# Patient Record
Sex: MALE | Race: WHITE
[De-identification: names, ages, dates, MRNs, and addresses within clinical notes are randomized per-mention and may not be internally consistent; named-entity substitution may affect disease eponyms.]

---

## 2024-02-13 NOTE — CR1_ITS
[facility] 
                                        
                                       Test Date:    2024 
Pat Name:     Jairo Pyle       Department:    
Patient ID:   [id number]               Room:         - 
Gender:       Male                     Technician:    
:          1990               Requested By: BOOGIE PARMAR 
Order Number: [id number]              Alexis MD:   BOOGIE PARMAR 
                           Interpretive Statements 
Session Date: 
 
Electronically Signed On 2024 20:55:11 EST by BOOGIE PARMAR

## 2024-02-14 ENCOUNTER — HOSPITAL ENCOUNTER (OUTPATIENT)
Dept: HOSPITAL 101 - CR | Age: 34
LOS: 21 days | Discharge: HOME | End: 2024-03-06
Payer: COMMERCIAL

## 2024-02-14 DIAGNOSIS — I42.9: ICD-10-CM

## 2024-02-14 DIAGNOSIS — I51.3: ICD-10-CM

## 2024-02-14 DIAGNOSIS — I22.2: Primary | ICD-10-CM

## 2024-02-14 PROCEDURE — 93798 PHYS/QHP OP CAR RHAB W/ECG: CPT

## 2024-02-15 ENCOUNTER — HOSPITAL ENCOUNTER (EMERGENCY)
Age: 34
LOS: 1 days | Discharge: ANOTHER HOSPITAL | End: 2024-02-16
Payer: COMMERCIAL

## 2024-02-15 VITALS — RESPIRATION RATE: 26 BRPM | HEART RATE: 66 BPM

## 2024-02-15 VITALS — RESPIRATION RATE: 17 BRPM | HEART RATE: 73 BPM

## 2024-02-15 VITALS — HEART RATE: 65 BPM | RESPIRATION RATE: 20 BRPM

## 2024-02-15 VITALS — HEART RATE: 69 BPM | RESPIRATION RATE: 17 BRPM | OXYGEN SATURATION: 100 %

## 2024-02-15 VITALS — HEART RATE: 77 BPM | RESPIRATION RATE: 20 BRPM

## 2024-02-15 VITALS
RESPIRATION RATE: 19 BRPM | HEART RATE: 67 BPM | SYSTOLIC BLOOD PRESSURE: 132 MMHG | OXYGEN SATURATION: 100 % | DIASTOLIC BLOOD PRESSURE: 78 MMHG

## 2024-02-15 VITALS — RESPIRATION RATE: 19 BRPM | HEART RATE: 62 BPM

## 2024-02-15 VITALS — HEART RATE: 67 BPM | RESPIRATION RATE: 17 BRPM

## 2024-02-15 VITALS
HEART RATE: 72 BPM | DIASTOLIC BLOOD PRESSURE: 89 MMHG | SYSTOLIC BLOOD PRESSURE: 139 MMHG | OXYGEN SATURATION: 99 % | TEMPERATURE: 97.88 F | RESPIRATION RATE: 18 BRPM

## 2024-02-15 VITALS — SYSTOLIC BLOOD PRESSURE: 139 MMHG | DIASTOLIC BLOOD PRESSURE: 88 MMHG | OXYGEN SATURATION: 100 %

## 2024-02-15 VITALS — HEART RATE: 63 BPM

## 2024-02-15 VITALS — OXYGEN SATURATION: 99 % | HEART RATE: 71 BPM | RESPIRATION RATE: 23 BRPM

## 2024-02-15 VITALS — HEART RATE: 68 BPM | RESPIRATION RATE: 18 BRPM

## 2024-02-15 VITALS — HEART RATE: 66 BPM | RESPIRATION RATE: 17 BRPM

## 2024-02-15 VITALS — OXYGEN SATURATION: 98 % | RESPIRATION RATE: 19 BRPM | HEART RATE: 74 BPM

## 2024-02-15 VITALS — RESPIRATION RATE: 22 BRPM

## 2024-02-15 VITALS — HEART RATE: 70 BPM | RESPIRATION RATE: 19 BRPM

## 2024-02-15 VITALS — OXYGEN SATURATION: 100 % | HEART RATE: 73 BPM | RESPIRATION RATE: 16 BRPM

## 2024-02-15 VITALS — RESPIRATION RATE: 17 BRPM | HEART RATE: 64 BPM

## 2024-02-15 VITALS — RESPIRATION RATE: 17 BRPM | HEART RATE: 71 BPM

## 2024-02-15 VITALS — RESPIRATION RATE: 15 BRPM | HEART RATE: 65 BPM

## 2024-02-15 VITALS — OXYGEN SATURATION: 99 % | HEART RATE: 75 BPM | RESPIRATION RATE: 24 BRPM

## 2024-02-15 VITALS — HEART RATE: 71 BPM | RESPIRATION RATE: 20 BRPM

## 2024-02-15 VITALS — RESPIRATION RATE: 19 BRPM | HEART RATE: 63 BPM

## 2024-02-15 VITALS — HEART RATE: 72 BPM | RESPIRATION RATE: 15 BRPM

## 2024-02-15 VITALS — HEART RATE: 75 BPM | RESPIRATION RATE: 12 BRPM

## 2024-02-15 VITALS — SYSTOLIC BLOOD PRESSURE: 141 MMHG | DIASTOLIC BLOOD PRESSURE: 86 MMHG | HEART RATE: 67 BPM | RESPIRATION RATE: 16 BRPM

## 2024-02-15 VITALS — BODY MASS INDEX: 29.8 KG/M2

## 2024-02-15 VITALS — HEART RATE: 67 BPM

## 2024-02-15 VITALS — RESPIRATION RATE: 16 BRPM | HEART RATE: 69 BPM | OXYGEN SATURATION: 100 %

## 2024-02-15 VITALS — RESPIRATION RATE: 18 BRPM | HEART RATE: 78 BPM

## 2024-02-15 DIAGNOSIS — I25.2: ICD-10-CM

## 2024-02-15 DIAGNOSIS — Z87.891: ICD-10-CM

## 2024-02-15 DIAGNOSIS — Z79.82: ICD-10-CM

## 2024-02-15 DIAGNOSIS — Z79.01: ICD-10-CM

## 2024-02-15 DIAGNOSIS — G45.9: Primary | ICD-10-CM

## 2024-02-15 DIAGNOSIS — Z79.899: ICD-10-CM

## 2024-02-15 LAB
ADD MANUAL DIFF: NO
ALANINE AMINOTRANSFERASE: 135 U/L (ref 16–63)
ALBUMIN GLOBULIN RATIO: 1
ALBUMIN LEVEL: 4 G/DL (ref 3.4–5)
ALKALINE PHOSPHATASE: 65 U/L (ref 46–116)
ANION GAP: 11.5
ASPARTATE AMINO TRANSFERASE: 50 U/L (ref 15–37)
BLOOD UREA NITROGEN: 12 MG/DL (ref 7–18)
CALCIUM: 9.3 MG/DL (ref 8.5–10.1)
CARBON DIOXIDE: 28.5 MMOL/L (ref 21–32)
CHLORIDE: 104 MMOL/L (ref 98–107)
CO2 BLD-SCNC: 28.5 MMOL/L (ref 21–32)
ESTIMATED GFR (AFRICAN AMERICA: >60 (ref 60–?)
ESTIMATED GFR (NON-AFRICAN AME: >60 (ref 60–?)
GLOBULIN: 3.9 G/DL
GLUCOSE BLD-MCNC: 116 MG/DL (ref 74–106)
HCT VFR BLD CALC: 46 % (ref 42–54)
HEMATOCRIT: 46 % (ref 42–54)
HEMOGLOBIN: 15.6 G/DL (ref 14–18)
IMMATURE GRANULOCYTES ABS AUTO: 0.02 10^3/UL (ref 0–0.03)
IMMATURE GRANULOCYTES PCT AUTO: 0.4 % (ref 0–0.5)
INR: 1.01
LYMPHOCYTES  ABSOLUTE AUTO: 2 10^3/UL (ref 1.2–3.8)
MCV RBC: 89 FL (ref 80–94)
MEAN CORPUSCULAR HEMOGLOBIN: 30.2 PG (ref 25.9–34)
MEAN CORPUSCULAR HGB CONC: 33.9 G/DL (ref 29.9–35.2)
MEAN CORPUSCULAR VOLUME: 89 FL (ref 80–94)
PARTIAL THROMBOPLASTIN TIME: 31.1 SEC (ref 22.3–36.2)
PLATELET # BLD: 146 10^3/UL (ref 150–450)
PLATELET COUNT: 146 10^3/UL (ref 150–450)
POTASSIUM SERPLBLD-SCNC: 4 MMOL/L (ref 3.5–5.1)
POTASSIUM: 4 MMOL/L (ref 3.5–5.1)
PROTHROMBIN TIME: 10.7 SEC (ref 9–11.6)
RED BLOOD COUNT: 5.17 10^6/UL (ref 4.7–6.1)
SODIUM BLD-SCNC: 140 MMOL/L (ref 136–145)
SODIUM: 140 MMOL/L (ref 136–145)
TOTAL PROTEIN: 7.9 G/DL (ref 6.4–8.2)
WBC # BLD: 5.7 10^3/UL (ref 4–11)
WHITE BLOOD COUNT: 5.7 10^3/UL (ref 4–11)

## 2024-02-15 PROCEDURE — 80053 COMPREHEN METABOLIC PANEL: CPT

## 2024-02-15 PROCEDURE — 70450 CT HEAD/BRAIN W/O DYE: CPT

## 2024-02-15 PROCEDURE — 36415 COLL VENOUS BLD VENIPUNCTURE: CPT

## 2024-02-15 PROCEDURE — 96374 THER/PROPH/DIAG INJ IV PUSH: CPT

## 2024-02-15 PROCEDURE — 70496 CT ANGIOGRAPHY HEAD: CPT

## 2024-02-15 PROCEDURE — 99285 EMERGENCY DEPT VISIT HI MDM: CPT

## 2024-02-15 PROCEDURE — 85730 THROMBOPLASTIN TIME PARTIAL: CPT

## 2024-02-15 PROCEDURE — 84484 ASSAY OF TROPONIN QUANT: CPT

## 2024-02-15 PROCEDURE — 96375 TX/PRO/DX INJ NEW DRUG ADDON: CPT

## 2024-02-15 PROCEDURE — 85025 COMPLETE CBC W/AUTO DIFF WBC: CPT

## 2024-02-15 PROCEDURE — 93005 ELECTROCARDIOGRAM TRACING: CPT

## 2024-02-15 PROCEDURE — 70498 CT ANGIOGRAPHY NECK: CPT

## 2024-02-15 PROCEDURE — 85610 PROTHROMBIN TIME: CPT

## 2024-02-15 NOTE — CT_ITS
[address] [address] 
     [phone] 
  
  
Patient Name: 
BRITTANY CHAMORRO 
  
MRN: [id number]    YOB: 1990    Sex: M 
Assigned Patient Location: ER 
Current Patient Location:  
Accession/Order Number: [id number] 
Exam Date: 2/15/2024  21:00    Report Date: 2/15/2024  21:56 
  
At the request of: 
MIRIAM WILHELM   
  
Procedure:  CT angio head 
  
EXAMINATION: CT Angiogram Neck and CT Angiogram Head 
  
TECHNIQUE: Multiple axial images of the head and neck were obtained during the  
  
dynamic intravenous administration of contrast material according to the  
standard protocol. 3-D volume rendered reformations were created on an  
independent workstation. The amount and type of contrast are recorded in the  
medical record.  
  
QPP DOCUMENTATION: All internal carotid artery percent stenoses are calculated  
  
using the distal internal carotid artery diameter as the denominator (NASCET  
criteria). At least one of the following dose reduction techniques was  
utilized: Iterative reconstruction, and/or Automatic Exposure Control, and/or  
mA/kV adjustment based on body size. 
  
INDICATION: Visual disturbance, slurred speech 
  
COMPARISON: CT head, 2/15/2024 
  
ENCOUNTER: Not applicable 
  
___________________________ 
  
FINDINGS: 
  
CTA NECK: 
Aortic Arch/Great Vessels: No aneurysmal dilation or dissection of the aortic  
arch. The brachiocephalic and subclavian arteries are patent.  
  
Right Carotid: No significant stenoses, occlusions or dissections of the  
common  
carotid, cervical internal carotid, or proximal external carotid arteries.  
  
Left Carotid: No significant stenoses, occlusions or dissections of the common  
  
carotid, cervical internal carotid, or proximal external carotid arteries 
  
Right Vertebral: Codominant. No significant stenoses, occlusions or  
dissections  
of the cervical segments of the artery.  
  
Left Vertebral: Codominant. No significant stenoses, occlusions or dissections  
  
of the cervical segments of the artery.  
  
Additional Findings: Mild straightening of the normal cervical lordosis. 
  
CTA HEAD: 
ICAs: The petrous, cavernous, and supraclinoid segments are patent. 
  
ACAs: No significant stenoses, occlusions, or aneurysms. 
  
MCAs: No significant stenoses, occlusions, or aneurysms. 
  
PCAs: No significant stenoses, occlusions, or aneurysms. 
  
Basilar: No significant stenoses, occlusions, or aneurysms. 
  
Vertebral Arteries: No significant stenoses, occlusions, or aneurysms. 
  
Dural Venous Sinuses: Limited assessment of the dural venous sinuses  
demonstrates no evidence of thrombosis. 
  
Additional Findings: None 
  
___________________________ 
  
ORDER #: [id number] CT/CT angio head  
IMPRESSION:  
   
1. No significant stenoses or occlusions throughout the extracranial carotid   
and vertebral arteries.  
2. No large vessel occlusions throughout the intracranial arteries  
   
   
Electronically authenticated by: DEANNE WEEKS   Date: 2/15/2024  21:56

## 2024-02-15 NOTE — ED_ITS
Documented by User:  Miriam Wilhelm  02/15/24 21:51    
    
HPI - Neuro Symptoms/Deficit    
General    
Chief Complaint: Neuro Symptoms/Deficit    
Stated Complaint: neuro symptoms    
Time Seen by Provider: 02/15/24 19:18    
Source: patient    
Mode of arrival: Wheelchair    
History of Present Illness    
HPI Narrative:     
33 year old male presents to the ED for blurred vision and slurred speech. Onset  
was approx 1845 with the blurred vision. He reported it was to his right eye;   
peripheral vision on the right. Significant other states he then developed   
slurred speech. Wife states it lasted 2-3 minutes. He currently reports   
intermittent N/T to various digits of his right hand. Mid  he was   
admitted to [facility] for an MI. He was told his MI was likely related  
to a viral illness he had in . He was told his EF was 20%. Pt is   
taking Eliquis and 81 mg asa daily. He denies fever, chills, weakness. Denies   
recent CP, SOB, dizziness. Denies recent fall. Denies pain currently. States he   
now feels anxious. 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 States his Entresto dose   
was doubled yesterday.    
Related Data    
                                Home Medications    
    
    
    
 Medication  Instructions  Recorded  Confirmed    
     
apixaban 5 mg tablet (Eliquis) 5 mg PO Q12H 02/15/24 02/15/24    
     
aspirin 81 mg tablet,delayed 81 mg PO .q24 02/15/24 02/15/24    
    
release       
     
empagliflozin 10 mg tablet 10 mg PO .q24 02/15/24 02/15/24    
    
(Jardiance)       
     
furosemide 40 mg tablet 40 mg PO Q12H 02/15/24 02/15/24    
     
metoprolol succinate 50 mg 50 mg PO .q24 02/15/24 02/15/24    
    
tablet,extended release 24 hr       
     
sacubitril 49 mg-valsartan 51 mg 1 tab PO BID 02/15/24 02/15/24    
    
tablet (Entresto)       
     
spironolactone 25 mg tablet 25 mg PO .q24 02/15/24 02/15/24    
    
    
    
                                    Allergies    
    
    
    
Allergy/AdvReac Type Severity Reaction Status Date / Time    
     
iodine Allergy Severe Anaphylaxis Verified 02/15/24 19:18    
    
    
    
    
Review of Systems    
    
    
ROS      
    
 Constitutional Denies: fever or chills       
    
 Eyes Reports: change in vision and blurry vision; Denies: blind spots, light   
sensitivity or eye discomfort       
    
 Ears, nose, mouth, and throat Denies: throat pain or neck pain       
    
 Cardiovascular Denies: chest pain       
    
 Respiratory Denies: shortness of breath       
    
 Gastrointestinal Denies: abdominal pain       
    
 Musculoskeletal Denies: neck pain, extremity pain or extremity swelling       
    
 Integumentary/Breast Denies: rash       
    
 Neurological Reports: numbness in extremities, slurred speech and difficulty   
communicating thoughts; Denies: headache, weakness in extremities, lack of   
coordination or dizziness       
    
 Psychiatric Reports: anxiety       
    
    
[facility]    
[facility]    
Medical History (Updated 02/15/24 @ 23:37 by Tamika Tejada MD)    
    
Heart attack    
   ?I21.9 - Acute myocardial infarction, unspecified (ICD-10)    
    
    
Social History    
Smoking status:  Former smoker     
    
    
    
Exam    
Constitutional    
Vital Signs, click to edit/add:     
    
                                Last Vital Signs    
    
    
    
Temp  98 F   02/15/24 19:11    
     
Pulse  63   24 00:14    
     
Resp  21   24 00:14    
     
BP  113/69   24 00:14    
     
Pulse Ox  99   02/15/24 20:40    
     
O2 Del Method  Room Air  02/15/24 19:11    
    
    
    
    
Common normals: no apparent distress and oriented x3    
General appearance: cooperative    
HENMT    
Common normals: normocephalic    
Face and sinus: normal facial exam    
Nose: external nose normal    
Mouth: oral and palatal mucosa normal, lip normal and tongue normal;     
no drooling    
Throat: uvula midline    
Eye    
Common normals: PERRL, EOMs intact bilaterally, conjunctivae normal and no   
scleral icterus    
Visual fields: no peripheral vision loss and no central vision loss    
Neck & C-Spine    
Common normals: supple    
Chest    
Chest: symmetrical chest wall rise    
Respiratory    
Common normals: normal respiratory effort and clear to auscultation bilaterally    
Effort & inspection: able to speak in complete sentences and symmetric chest   
movement    
Cardio    
Common normals: regular rate and regular rhythm    
Extremity    
Common normals: normal to inspection, full ROM and normal capillary refill    
Neuro    
Common normals: oriented x3, CN's II-XII intact bilaterally, moves all   
extremities, no focal motor deficits, no sensory deficits noted and gait normal    
Sensorium/orientation: awake and alert    
Speech: speech normal    
Motor exam: strength 5/5 throughout and no pronator drift    
Coordination: finger-to-nose test normal and heel-to-shin test normal    
Other:     
NIH Stroke Scale/Score (NIHSS) from [email or web address]  on 2/15/2024    
** All calculations should be rechecked by clinician prior to use **    
    
RESULT SUMMARY:    
0 points    
NIH Stroke Scale    
    
    
INPUTS:    
1A: Level of consciousness ?> 0 = Alert; keenly responsive    
1B: Ask month and age ?> 0 = Both questions right    
1C: 'Blink eyes' & 'squeeze hands' ?> 0 = Performs both tasks    
2: Horizontal extraocular movements ?> 0 = Normal    
3: Visual fields ?> 0 = No visual loss    
4: Facial palsy ?> 0 = Normal symmetry    
5A: Left arm motor drift ?> 0 = No drift for 10 seconds    
5B: Right arm motor drift ?> 0 = No drift for 10 seconds    
6A: Left leg motor drift ?> 0 = No drift for 5 seconds    
6B: Right leg motor drift ?> 0 = No drift for 5 seconds    
7: Limb Ataxia ?> 0 = No ataxia    
8: Sensation ?> 0 = Normal; no sensory loss    
9: Language/aphasia ?> 0 = Normal; no aphasia    
10: Dysarthria ?> 0 = Normal    
11: Extinction/inattention ?> 0 = No abnormality    
    
    
Course    
Reevaluation(s)    
Reevaluation #1:     
Pt reports all sx have resolved, including the N/T to his right hand.    
Time: 21:03    
Vital Signs    
Vital signs:     
    
                                   Vital Signs    
    
    
    
Temperature  98 F   02/15/24 19:11    
     
Pulse Rate  72   02/15/24 19:11    
     
Respiratory Rate  18   02/15/24 19:11    
     
Blood Pressure  139/89   02/15/24 19:11    
     
Pulse Oximetry  99   02/15/24 19:11    
     
Oxygen Delivery Method  Room Air  02/15/24 19:11    
    
    
                                            
    
    
    
Temperature  98 F   02/15/24 19:11    
     
Pulse Rate  63   24 00:14    
     
Respiratory Rate  21   24 00:14    
     
Blood Pressure  113/69   24 00:14    
     
Pulse Oximetry  99   02/15/24 20:40    
     
Oxygen Delivery Method  Room Air  02/15/24 19:11    
    
    
    
    
    
MDM - Neuro Symptoms/Deficit    
MDM Narrative    
Medical decision making narrative:     
CBC, BMP were unremarkable. LFTs were mildly elevated. CT head without contrast   
was negative for acute findings. CTA head and neck were pending. Care was   
resumed to Dr. Tejada. See her dictation for further evaluation and treatment.     
Medical Records    
Attestation: I reviewed the patient's medical records.    
Lab Data    
Attestation: I reviewed the patient's lab results.    
Labs:     
    
                                   Lab Results    
    
    
    
  02/15/24 02/15/24 Range/Units    
    
  19:21 19:45     
     
WBC  5.7   (4.0-11.0)  10^3/uL    
     
RBC  5.17   (4.70-6.10)  10^6/uL    
     
Hgb  15.6   (14.0-18.0)  g/dL    
     
Hct  46.0   (42.0-54.0)  %    
     
MCV  89.0   (80.0-94.0)  fL    
     
MCH  30.2   (25.9-34.0)  pg    
     
MCHC  33.9   (29.9-35.2)  g/dL    
     
RDW  12.7   (11.0-15.0)  %    
     
Plt Count  146 L   (150-450)  10^3/uL    
     
MPV  9.4 L   (9.5-13.5)  fL    
     
Neut % (Auto)  54.6   (43.0-75.0)  %    
     
Lymph % (Auto)  34.6   (20.5-60.0)  %    
     
Mono % (Auto)  7.4   (1.7-12.0)  %    
     
Eos % (Auto)  2.3   (0.9-7.0)  %    
     
Baso % (Auto)  0.7   (0.2-2.0)  %    
     
Neut # (Auto)  3.1   (1.4-6.5)  10^3/uL    
     
Lymph # (Auto)  2.0   (1.2-3.8)  10^3/uL    
     
Mono # (Auto)  0.4   (0.3-0.8)  10^3/uL    
     
Eos # (Auto)  0.1   (0.0-0.7)  10^3/uL    
     
Baso # (Auto)  0.0   (0.0-0.1)  10^3/uL    
     
Abs Immat Gran (auto)  0.02   (0.00-0.03)  10^3/uL    
     
Imm/Tot Granulo (auto)  0.4   (0.0-0.5)  %    
     
PT  10.7   (9.0-11.6)  sec    
     
INR  1.01       
     
APTT  31.1   (22.3-36.2)  sec    
     
Sodium  140   (136-145)  mmol/L    
     
Potassium  4.0   (3.5-5.1)  mmol/L    
     
Chloride  104   ()  mmol/L    
     
Carbon Dioxide  28.5   (21.0-32.0)  mmol/L    
     
Anion Gap  11.5       
     
BUN  12.0   (7.0-18.0)  mg/dL    
     
Creatinine  0.99   (0.70-1.30)  mg/dL    
     
Est GFR ( Amer)  >60   (>=60)      
     
Est GFR (Non-Af Amer)  >60   (>=60)      
     
BUN/Creatinine Ratio  12.1       
     
Glucose  116 H   ()  mg/dL    
     
Calcium  9.3   (8.5-10.1)  mg/dL    
     
Total Bilirubin  1.1 H   (0.2-1.0)  mg/dL    
     
AST  50 H   (15-37)  U/L    
     
ALT  135 H   (16-63)  U/L    
     
Alkaline Phosphatase  65   ()  U/L    
     
Troponin I High Sens  11.7   (4.0-76.1)  pg/mL    
     
Total Protein  7.9   (6.4-8.2)  g/dL    
     
Albumin  4.0   (3.4-5.0)  g/dL    
     
Globulin  3.9   g/dL    
     
Albumin/Globulin Ratio  1.0       
     
POC Glucose   98  ()  mg/dL    
    
    
    
    
Imaging Data    
CT scan - head:     
      Attestation: I have reviewed the pertinent imaging results.    
      Radiologist's impression:     
    
ITS Impressions    
    
Head CT  02/15/24 19:19    
IMPRESSION:    
     
No acute or significant intracranial pathology.     
     
     
Electronically authenticated by: TAMMI GARCIA   Date: 2/15/2024  19:55    
    
    
Head CTA  02/15/24 19:19    
IMPRESSION:    
     
1. No significant stenoses or occlusions throughout the extracranial carotid     
and vertebral arteries.    
2. No large vessel occlusions throughout the intracranial arteries    
     
     
Electronically authenticated by: DEANNE WEEKS   Date: 2/15/2024  21:56    
    
    
Neck CTA  02/15/24 19:19    
IMPRESSION:    
     
1. No significant stenoses or occlusions throughout the extracranial carotid     
and vertebral arteries.    
2. No large vessel occlusions throughout the intracranial arteries    
     
     
Electronically authenticated by: DEANNE WEEKS   Date: 2/15/2024  21:56    
    
    
    
Procedure:  CT head/brain wo con    
     
CT OF THE BRAIN WITHOUT CONTRAST: 2/15/2024 7:28 PM EST    
     
HISTORY: Visual disturbance and slurred speech    
     
TECHNIQUE: Contiguous axially collimated images were obtained through the     
intracranial compartment, from the vertex through the foramen magnum. Coronal     
and Sagittal reformatted images were prepared on a separate workstation and     
reviewed on the PACS for anatomic correlation. No contrast was administered.     
This CT exam was performed using one or more of the following dose reduction     
techniques: Automated exposure control, adjustment of the mA and/or kV     
according to patient size, or use of iterative reconstruction technique. Thin     
section coronal and sagittal images were reconstructed from the axial data     
set.     
All images were reviewed and interpreted.    
     
COMPARISON: None.    
     
FINDINGS:     
     
There is no intracranial hemorrhage or abnormal extra-axial fluid collection.    
     
To the extent of evaluated with noncontrast technique, there is no mass lesion     
     
appreciated. There is no mass-effect or shift of midline structures. The     
ventricles and CSF spaces are age appropriate. There is no evidence of     
hydrocephalus. There is no effacement of the basal cisterns.     
     
Gray white matter differentiation is well preserved throughout, without     
evidence of acute ischemia. There is no significant leukomalacia. The basal     
ganglia and thalami are unremarkable.    
     
The posterior fossa, brain stem, and fourth ventricle are normal. There is no     
tonsillar ectopy.     
     
The calvarium is intact, without destructive lesion or depressed fracture.    
     
Limited assessment of orbits. No obvious abnormality. Correlate with     
ophthalmic     
exam.    
     
The mastoid air cells are well-aerated. The paranasal sinuses are normally     
aerated.    
     
    
ORDER #: [id number] CT/CT head/brain wo con    
IMPRESSION:    
     
No acute or significant intracranial pathology.     
     
     
Electronically authenticated by: TAMMI GARCIA   Date: 2/15/2024  19:55    
ECG Data    
Attestation: ?I have reviewed the pertinent ECG results. (EKG was reviewed by   
the attending physician. It showed sinus rhythm at a rate of 63. No acute ST   
segment changes. )    
Interpretation:     
Measurements    
Intervals                              Axis              
Rate:         63                       P:            43    
KS:           198                      QRS:          -7    
QRSD:         102                      T:            24    
QT:           378                                        
QTc:          384                                        
                           Interpretive Statements    
1100 Sinus rhythm    
3114 Cannot rule out anterior myocardial infarction, age undetermined    
9150 **  abnormal ECG  **    
No previous ECG available for comparison    
    
Discharge Plan    
Discharge    
Chief Complaint: Neuro Symptoms/Deficit    
    
Clinical Impression:    
 Transient cerebral ischemia    
    
    
Patient Disposition: [facility]    
    
Time of Disposition Decision: 23:36    
    
Discharge Location: [facility]    
    
Condition: Good    
    
    
___________________________________________________________________________    
    
Documented by User:  Tamika Tejada MD  24 00:25    
    
HPI - Neuro Symptoms/Deficit    
General    
Chief Complaint: Neuro Symptoms/Deficit    
Stated Complaint: neuro symptoms    
Time Seen by Provider: 02/15/24 19:18    
Source: family    
Related Data    
                                Home Medications    
    
    
    
 Medication  Instructions  Recorded  Confirmed    
     
apixaban 5 mg tablet (Eliquis) 5 mg PO Q12H 02/15/24 02/15/24    
     
aspirin 81 mg tablet,delayed 81 mg PO .q24 02/15/24 02/15/24    
    
release       
     
empagliflozin 10 mg tablet 10 mg PO .q24 02/15/24 02/15/24    
    
(Jardiance)       
     
furosemide 40 mg tablet 40 mg PO Q12H 02/15/24 02/15/24    
     
metoprolol succinate 50 mg 50 mg PO .q24 02/15/24 02/15/24    
    
tablet,extended release 24 hr       
     
sacubitril 49 mg-valsartan 51 mg 1 tab PO BID 02/15/24 02/15/24    
    
tablet (Entresto)       
     
spironolactone 25 mg tablet 25 mg PO .q24 02/15/24 02/15/24    
    
    
    
                                    Allergies    
    
    
    
Allergy/AdvReac Type Severity Reaction Status Date / Time    
     
iodine Allergy Severe Anaphylaxis Verified 02/15/24 19:18    
    
    
    
    
[facility]    
Medical History (Updated 02/15/24 @ 23:37 by Tamika Tejada MD)    
    
Heart attack    
   ?I21.9 - Acute myocardial infarction, unspecified (ICD-10)    
    
    
Social History    
Smoking status:  Former smoker     
    
    
    
Exam    
Constitutional    
Vital Signs, click to edit/add:     
    
                                Last Vital Signs    
    
    
    
Temp  98 F   02/15/24 19:11    
     
Pulse  63   24 00:14    
     
Resp  21   24 00:14    
     
BP  113/69   24 00:14    
     
Pulse Ox  99   02/15/24 20:40    
     
O2 Del Method  Room Air  02/15/24 19:11    
    
    
    
    
    
Course    
Vital Signs    
Vital signs:     
    
                                   Vital Signs    
    
    
    
Temperature  98 F   02/15/24 19:11    
     
Pulse Rate  72   02/15/24 19:11    
     
Respiratory Rate  18   02/15/24 19:11    
     
Blood Pressure  139/89   02/15/24 19:11    
     
Pulse Oximetry  99   02/15/24 19:11    
     
Oxygen Delivery Method  Room Air  02/15/24 19:11    
    
    
                                            
    
    
    
Temperature  98 F   02/15/24 19:11    
     
Pulse Rate  63   24 00:14    
     
Respiratory Rate  21   24 00:14    
     
Blood Pressure  113/69   24 00:14    
     
Pulse Oximetry  99   02/15/24 20:40    
     
Oxygen Delivery Method  Room Air  02/15/24 19:11    
    
    
    
    
    
MDM - Neuro Symptoms/Deficit    
MDM Narrative    
Medical decision making narrative:     
CBC, BMP were unremarkable. LFTs were mildly elevated. CT head without contrast   
was negative for acute findings. CTA head and neck were pending. Care was   
resumed to Dr. Tejada. See her dictation for further evaluation and treatment.     
    
33-year-old male was seen and evaluated in conjunction with the physician   
assistant. Please refer to his full H and P. In brief thhis patient had a   
cardiac event in January of this year in which she was told that he had an   
ejection fraction of 20 percent with clean coronary arteries and had heart   
failure. He was told that he may have these symptoms due to a viral   
illness/myocarditis I assume. He is on Eliquis, aspirin and Entresto. Early this  
evening while at home the patient had an episode of dysarthria that lasted   
several minutes. He then had some change in his vision in his right eye,   
peripheral vision. Upon arrival his dysarthria and vision change had resolved   
but he was having some numbness and tingling in the right hand. Accu-Chek was   
97. His NIH stroke scale was 0. His peripheral vision was intact, there is no   
facial droop, his cognition was intact, he still felt that he was having trouble  
finding some words. He was shaking his right hand stating that he was having   
some numbness and tingling in the right hand.He was able to approximate thumb   
and all fingers. Sensation was intact and was equal bilaterally. He was taken   
for a CT scan of the head which was read by radiology as normal. He has a   
contrast ALLERGY so he was premedicated with Solu-Medrol, Pepcid and Benadryl   
and CT angiogram of the head and neck was ordered. These are normal according to  
radiology. The patient was reevaluated several times while in the emergency   
department while waiting for his studies to results and his symptoms have   
completely resolved including the numbness and tingling in the right 5th finger.    
Labs are reviewed and EKG is reviewed. EKG is essentially unchanged from prior   
EKGs and does not show any acute findings.     
Troponin is within normal limits.    
I discussed my concerns about the patient having a transient ischemic attack   
which could be a prrecursor to a stroke considering his recent cardiac problems,  
low ejection fraction and suggested that he be transferred to a higher level of   
care where neurology is available. The patient spoke to his wife and father and   
ultimately agreed to transfer. The case was discussed with Dr. Kat at   
[facility] and he is accepted for transfer    
The patient asked if he should take his nighttime medications and I suggested   
that he did and at that time, he realized that he may have missed a dose of his   
Eliquis..    
Patient will be transferred to [facility] by private vehicle due to an extended   
wait time for EMS transfer    
Medical Records    
Medical records narrative:     
    
    
    
    
    
                              The [address] [address]    
                                            
                                 CT Scan Report     
                                     Signed    
Patient: BRITTANY CHAMORRO    
    
    
MR#: [id number]    
: 1990    
    
    
Acct:[id number]    
Age/Sex: 33 / M    
    
    
ADM Date: 02/15/24    
Loc: ER      
    
    
Attending Dr:     
    
    
Ordering Physician: Miriam Wilhelm    
Date of Service: 02/15/24    
Procedure(s): CT head/brain wo con    
Accession Number(s): [id number]    
    
cc: Physician,Non-Staff M.CASEY~    
    
    
     
     The [address] [address] [phone]    
     
     
Patient Name:    
BRITTANY CHAMORRO    
     
MRN: [id number]    YOB: 1990    Sex: M    
Assigned Patient Location: ER    
Current Patient Location: ER    
Accession/Order Number: [id number]    
Exam Date: 2/15/2024  19:28    Report Date: 2/15/2024  19:55    
     
At the request of:    
MIRIAM WILHELM      
     
Procedure:  CT head/brain wo con    
     
CT OF THE BRAIN WITHOUT CONTRAST: 2/15/2024 7:28 PM EST    
     
HISTORY: Visual disturbance and slurred speech    
     
TECHNIQUE: Contiguous axially collimated images were obtained through the     
intracranial compartment, from the vertex through the foramen magnum. Coronal     
and Sagittal reformatted images were prepared on a separate workstation and     
reviewed on the PACS for anatomic correlation. No contrast was administered.     
This CT exam was performed using one or more of the following dose reduction     
techniques: Automated exposure control, adjustment of the mA and/or kV     
according to patient size, or use of iterative reconstruction technique. Thin     
section coronal and sagittal images were reconstructed from the axial data     
set.     
All images were reviewed and interpreted.    
     
COMPARISON: None.    
     
FINDINGS:     
     
There is no intracranial hemorrhage or abnormal extra-axial fluid collection.    
     
To the extent of evaluated with noncontrast technique, there is no mass lesion     
     
appreciated. There is no mass-effect or shift of midline structures. The     
ventricles and CSF spaces are age appropriate. There is no evidence of     
hydrocephalus. There is no effacement of the basal cisterns.     
     
Gray white matter differentiation is well preserved throughout, without     
evidence of acute ischemia. There is no significant leukomalacia. The basal     
ganglia and thalami are unremarkable.    
     
The posterior fossa, brain stem, and fourth ventricle are normal. There is no     
tonsillar ectopy.     
     
The calvarium is intact, without destructive lesion or depressed fracture.    
     
Limited assessment of orbits. No obvious abnormality. Correlate with     
ophthalmic     
exam.    
     
The mastoid air cells are well-aerated. The paranasal sinuses are normally     
aerated.    
     
    
ORDER #: [id number] CT/CT head/brain wo con    
IMPRESSION:    
     
No acute or significant intracranial pathology.     
     
     
Electronically authenticated by: TAMMI GARCIA   Date: 2/15/2024  19:55    
Lab Data    
Labs:     
    
                                   Lab Results    
    
    
    
  02/15/24 02/15/24 Range/Units    
    
  19:21 19:45     
     
WBC  5.7   (4.0-11.0)  10^3/uL    
     
RBC  5.17   (4.70-6.10)  10^6/uL    
     
Hgb  15.6   (14.0-18.0)  g/dL    
     
Hct  46.0   (42.0-54.0)  %    
     
MCV  89.0   (80.0-94.0)  fL    
     
MCH  30.2   (25.9-34.0)  pg    
     
MCHC  33.9   (29.9-35.2)  g/dL    
     
RDW  12.7   (11.0-15.0)  %    
     
Plt Count  146 L   (150-450)  10^3/uL    
     
MPV  9.4 L   (9.5-13.5)  fL    
     
Neut % (Auto)  54.6   (43.0-75.0)  %    
     
Lymph % (Auto)  34.6   (20.5-60.0)  %    
     
Mono % (Auto)  7.4   (1.7-12.0)  %    
     
Eos % (Auto)  2.3   (0.9-7.0)  %    
     
Baso % (Auto)  0.7   (0.2-2.0)  %    
     
Neut # (Auto)  3.1   (1.4-6.5)  10^3/uL    
     
Lymph # (Auto)  2.0   (1.2-3.8)  10^3/uL    
     
Mono # (Auto)  0.4   (0.3-0.8)  10^3/uL    
     
Eos # (Auto)  0.1   (0.0-0.7)  10^3/uL    
     
Baso # (Auto)  0.0   (0.0-0.1)  10^3/uL    
     
Abs Immat Gran (auto)  0.02   (0.00-0.03)  10^3/uL    
     
Imm/Tot Granulo (auto)  0.4   (0.0-0.5)  %    
     
PT  10.7   (9.0-11.6)  sec    
     
INR  1.01       
     
APTT  31.1   (22.3-36.2)  sec    
     
Sodium  140   (136-145)  mmol/L    
     
Potassium  4.0   (3.5-5.1)  mmol/L    
     
Chloride  104   ()  mmol/L    
     
Carbon Dioxide  28.5   (21.0-32.0)  mmol/L    
     
Anion Gap  11.5       
     
BUN  12.0   (7.0-18.0)  mg/dL    
     
Creatinine  0.99   (0.70-1.30)  mg/dL    
     
Est GFR ( Amer)  >60   (>=60)      
     
Est GFR (Non-Af Amer)  >60   (>=60)      
     
BUN/Creatinine Ratio  12.1       
     
Glucose  116 H   ()  mg/dL    
     
Calcium  9.3   (8.5-10.1)  mg/dL    
     
Total Bilirubin  1.1 H   (0.2-1.0)  mg/dL    
     
AST  50 H   (15-37)  U/L    
     
ALT  135 H   (16-63)  U/L    
     
Alkaline Phosphatase  65   ()  U/L    
     
Troponin I High Sens  11.7   (4.0-76.1)  pg/mL    
     
Total Protein  7.9   (6.4-8.2)  g/dL    
     
Albumin  4.0   (3.4-5.0)  g/dL    
     
Globulin  3.9   g/dL    
     
Albumin/Globulin Ratio  1.0       
     
POC Glucose   98  ()  mg/dL    
    
    
    
    
Imaging Data    
CT scan - head:     
      Radiologist's impression:     
    
ITS Impressions    
    
Head CT  02/15/24 19:19    
IMPRESSION:    
     
No acute or significant intracranial pathology.     
     
     
Electronically authenticated by: TAMMI GARCIA   Date: 2/15/2024  19:55    
    
    
Head CTA  02/15/24 19:19    
IMPRESSION:    
     
1. No significant stenoses or occlusions throughout the extracranial carotid     
and vertebral arteries.    
2. No large vessel occlusions throughout the intracranial arteries    
     
     
Electronically authenticated by: DEANNE WEEKS   Date: 2/15/2024  21:56    
    
    
Neck CTA  02/15/24 19:19    
IMPRESSION:    
     
1. No significant stenoses or occlusions throughout the extracranial carotid     
and vertebral arteries.    
2. No large vessel occlusions throughout the intracranial arteries    
     
     
Electronically authenticated by: DEANNE WEEKS   Date: 2/15/2024  21:56    
    
    
    
    
ECG Data    
Attestation: I personally reviewed and interpreted this ECG as follows: (EKG was  
reviewed by the attending physician. It showed sinus rhythm at a rate of 63. No   
acute ST segment changes. )    
    
Critical Care Time    
Critical Care Time    
Critical Care Time: Yes    
Total Critical Care Time: 40    
Attestation:     
Patient was seen by myself and Feli Caputo, I was unable to close the chart   
with her documentation in place and some of her documentation may be deleted    
    
Discharge Plan    
Discharge    
Chief Complaint: Neuro Symptoms/Deficit    
    
Clinical Impression:    
 Transient cerebral ischemia    
    
    
Patient Disposition: [facility]    
    
Time of Disposition Decision: 23:36    
    
Discharge Location: [facility]    
    
Condition: Good

## 2024-02-15 NOTE — CT_ITS
The [address] [address] 
     [phone] 
  
  
Patient Name: 
BRITTANY CHAMORRO 
  
MRN: [id number]    YOB: 1990    Sex: M 
Assigned Patient Location: ER 
Current Patient Location:  
Accession/Order Number: [id number] 
Exam Date: 2/15/2024  19:28    Report Date: 2/15/2024  19:55 
  
At the request of: 
MIRIAM WILHELM   
  
Procedure:  CT head/brain wo con 
  
CT OF THE BRAIN WITHOUT CONTRAST: 2/15/2024 7:28 PM EST 
  
HISTORY: Visual disturbance and slurred speech 
  
TECHNIQUE: Contiguous axially collimated images were obtained through the  
intracranial compartment, from the vertex through the foramen magnum. Coronal  
and Sagittal reformatted images were prepared on a separate workstation and  
reviewed on the PACS for anatomic correlation. No contrast was administered.  
This CT exam was performed using one or more of the following dose reduction  
techniques: Automated exposure control, adjustment of the mA and/or kV  
according to patient size, or use of iterative reconstruction technique. Thin  
section coronal and sagittal images were reconstructed from the axial data  
set.  
All images were reviewed and interpreted. 
  
COMPARISON: None. 
  
FINDINGS:  
  
There is no intracranial hemorrhage or abnormal extra-axial fluid collection. 
  
To the extent of evaluated with noncontrast technique, there is no mass lesion  
  
appreciated. There is no mass-effect or shift of midline structures. The  
ventricles and CSF spaces are age appropriate. There is no evidence of  
hydrocephalus. There is no effacement of the basal cisterns.  
  
Gray white matter differentiation is well preserved throughout, without  
evidence of acute ischemia. There is no significant leukomalacia. The basal  
ganglia and thalami are unremarkable. 
  
The posterior fossa, brain stem, and fourth ventricle are normal. There is no  
tonsillar ectopy.  
  
The calvarium is intact, without destructive lesion or depressed fracture. 
  
Limited assessment of orbits. No obvious abnormality. Correlate with  
ophthalmic  
exam. 
  
The mastoid air cells are well-aerated. The paranasal sinuses are normally  
aerated. 
  
ORDER #: [id number] CT/CT head/brain wo con  
IMPRESSION:  
   
No acute or significant intracranial pathology.   
   
   
Electronically authenticated by: TAMMI GARCIA   Date: 2/15/2024  19:55

## 2024-02-15 NOTE — CT_ITS
[address] [address] 
     [phone] 
  
  
Patient Name: 
BRITTANY CHAMORRO 
  
MRN: [id number]    YOB: 1990    Sex: M 
Assigned Patient Location: ER 
Current Patient Location:  
Accession/Order Number: [id number] 
Exam Date: 2/15/2024  21:00    Report Date: 2/15/2024  21:56 
  
At the request of: 
MIRIAM WILHELM   
  
Procedure:  CT angio neck 
  
EXAMINATION: CT Angiogram Neck and CT Angiogram Head 
  
TECHNIQUE: Multiple axial images of the head and neck were obtained during the  
  
dynamic intravenous administration of contrast material according to the  
standard protocol. 3-D volume rendered reformations were created on an  
independent workstation. The amount and type of contrast are recorded in the  
medical record.  
  
QPP DOCUMENTATION: All internal carotid artery percent stenoses are calculated  
  
using the distal internal carotid artery diameter as the denominator (NASCET  
criteria). At least one of the following dose reduction techniques was  
utilized: Iterative reconstruction, and/or Automatic Exposure Control, and/or  
mA/kV adjustment based on body size. 
  
INDICATION: Visual disturbance, slurred speech 
  
COMPARISON: CT head, 2/15/2024 
  
ENCOUNTER: Not applicable 
  
___________________________ 
  
FINDINGS: 
  
CTA NECK: 
Aortic Arch/Great Vessels: No aneurysmal dilation or dissection of the aortic  
arch. The brachiocephalic and subclavian arteries are patent.  
  
Right Carotid: No significant stenoses, occlusions or dissections of the  
common  
carotid, cervical internal carotid, or proximal external carotid arteries.  
  
Left Carotid: No significant stenoses, occlusions or dissections of the common  
  
carotid, cervical internal carotid, or proximal external carotid arteries 
  
Right Vertebral: Codominant. No significant stenoses, occlusions or  
dissections  
of the cervical segments of the artery.  
  
Left Vertebral: Codominant. No significant stenoses, occlusions or dissections  
  
of the cervical segments of the artery.  
  
Additional Findings: Mild straightening of the normal cervical lordosis. 
  
CTA HEAD: 
ICAs: The petrous, cavernous, and supraclinoid segments are patent. 
  
ACAs: No significant stenoses, occlusions, or aneurysms. 
  
MCAs: No significant stenoses, occlusions, or aneurysms. 
  
PCAs: No significant stenoses, occlusions, or aneurysms. 
  
Basilar: No significant stenoses, occlusions, or aneurysms. 
  
Vertebral Arteries: No significant stenoses, occlusions, or aneurysms. 
  
Dural Venous Sinuses: Limited assessment of the dural venous sinuses  
demonstrates no evidence of thrombosis. 
  
Additional Findings: None 
  
___________________________ 
  
ORDER #: [id number] CT/CT angio neck  
IMPRESSION:  
   
1. No significant stenoses or occlusions throughout the extracranial carotid   
and vertebral arteries.  
2. No large vessel occlusions throughout the intracranial arteries  
   
   
Electronically authenticated by: DEANNE WEEKS   Date: 2/15/2024  21:56

## 2024-02-15 NOTE — ECG_ITS
The [facility] 
                                        
                                       Test Date:    2024-02-15 
Pat Name:     BRITTANY CHAMORRO       Department:    
Patient ID:   [id number]               Room:         - 
Gender:       Male                     Technician:    
:          1990               Requested By: [id number] 
Order Number: [id number]              Reading MD:   BOOGIE PARMAR 
                                 Measurements 
Intervals                              Axis           
Rate:         63                       P:            43 
WY:           198                      QRS:          -7 
QRSD:         102                      T:            24 
QT:           378                                     
QTc:          384                                     
                           Interpretive Statements 
1100 Sinus rhythm 
Low voltage across the precordium 
9150 **  abnormal ECG  ** 
Electronically Signed On 2- 21:29:08 EST by BOOGIE PARMAR

## 2024-02-15 NOTE — ED.NEUROSD1
Documented by User:  Miriam Wilhelm  02/15/24 21:51

HPI - Neuro Symptoms/Deficit
General
Chief Complaint: Neuro Symptoms/Deficit
Stated Complaint: neuro symptoms
Time Seen by Provider: 02/15/24 19:18
Source: patient
Mode of arrival: Wheelchair
History of Present Illness
HPI Narrative: 
33 year old male presents to the ED for blurred vision and slurred speech. Onset was approx 1845 with the blurred vision. He reported it was to his right eye; peripheral vision on the right. Significant other states he then developed slurred speech. 
Wife states it lasted 2-3 minutes. He currently reports intermittent N/T to various digits of his right hand. Mid  he was admitted to [facility] for an MI. He was told his MI was likely related to a viral illness he had in 
. He was told his EF was 20%. Pt is taking Eliquis and 81 mg asa daily. He denies fever, chills, weakness. Denies recent CP, SOB, dizziness. Denies recent fall. Denies pain currently. States he now feels anxious. Reports being sober 
since his MI. States his Entresto dose was doubled yesterday.
Related Data
Home Medications

 Medication  Instructions  Recorded  Confirmed
apixaban 5 mg tablet (Eliquis) 5 mg PO Q12H 02/15/24 02/15/24
aspirin 81 mg tablet,delayed 81 mg PO .q24 02/15/24 02/15/24
release   
empagliflozin 10 mg tablet 10 mg PO .q24 02/15/24 02/15/24
(Jardiance)   
furosemide 40 mg tablet 40 mg PO Q12H 02/15/24 02/15/24
metoprolol succinate 50 mg 50 mg PO .q24 02/15/24 02/15/24
tablet,extended release 24 hr   
sacubitril 49 mg-valsartan 51 mg 1 tab PO BID 02/15/24 02/15/24
tablet (Entresto)   
spironolactone 25 mg tablet 25 mg PO .q24 02/15/24 02/15/24


Allergies

Allergy/AdvReac Type Severity Reaction Status Date / Time
iodine Allergy Severe Anaphylaxis Verified 02/15/24 19:18



Review of Systems
ROS  
 Constitutional Denies: fever or chills   
 Eyes Reports: change in vision and blurry vision; Denies: blind spots, light sensitivity or eye discomfort   
 Ears, nose, mouth, and throat Denies: throat pain or neck pain   
 Cardiovascular Denies: chest pain   
 Respiratory Denies: shortness of breath   
 Gastrointestinal Denies: abdominal pain   
 Musculoskeletal Denies: neck pain, extremity pain or extremity swelling   
 Integumentary/Breast Denies: rash   
 Neurological Reports: numbness in extremities, slurred speech and difficulty communicating thoughts; Denies: headache, weakness in extremities, lack of coordination or dizziness   
 [facility] Reports: anxiety   

[facility]
Medical History (Updated 02/15/24 @ 23:37 by Tamika Tejada MD)

Heart attack
 ?I21.9 - Acute myocardial infarction, unspecified (ICD-10)


Social History
Smoking status:  Former smoker 



Exam
Constitutional
Vital Signs, click to edit/add: 

Last Vital Signs

Temp  98 F   02/15/24 19:11
Pulse  63   24 00:14
Resp  21   24 00:14
BP  113/69   24 00:14
Pulse Ox  99   02/15/24 20:40
O2 Del Method  Room Air  02/15/24 19:11



Common normals: no apparent distress and oriented x3
General appearance: cooperative
HENMT
Common normals: normocephalic
Face and sinus: normal facial exam
Nose: external nose normal
Mouth: oral and palatal mucosa normal, lip normal and tongue normal; 
no drooling
Throat: uvula midline
Eye
Common normals: PERRL, EOMs intact bilaterally, conjunctivae normal and no scleral icterus
Visual fields: no peripheral vision loss and no central vision loss
Neck & C-Spine
Common normals: supple
Chest
Chest: symmetrical chest wall rise
Respiratory
Common normals: normal respiratory effort and clear to auscultation bilaterally
Effort & inspection: able to speak in complete sentences and symmetric chest movement
Cardio
Common normals: regular rate and regular rhythm
Extremity
Common normals: normal to inspection, full ROM and normal capillary refill
Neuro
Common normals: oriented x3, CN's II-XII intact bilaterally, moves all extremities, no focal motor deficits, no sensory deficits noted and gait normal
Sensorium/orientation: awake and alert
Speech: speech normal
Motor exam: strength 5/5 throughout and no pronator drift
Coordination: finger-to-nose test normal and heel-to-shin test normal
Other: 
NIH Stroke Scale/Score (NIHSS) from [employer]  on 2/15/2024
** All calculations should be rechecked by clinician prior to use **

RESULT SUMMARY:
0 points
NIH Stroke Scale


INPUTS:
1A: Level of consciousness ?> 0 = Alert; keenly responsive
1B: Ask month and age ?> 0 = Both questions right
1C: 'Blink eyes' & 'squeeze hands' ?> 0 = Performs both tasks
2: Horizontal extraocular movements ?> 0 = Normal
3: Visual fields ?> 0 = No visual loss
4: Facial palsy ?> 0 = Normal symmetry
5A: Left arm motor drift ?> 0 = No drift for 10 seconds
5B: Right arm motor drift ?> 0 = No drift for 10 seconds
6A: Left leg motor drift ?> 0 = No drift for 5 seconds
6B: Right leg motor drift ?> 0 = No drift for 5 seconds
7: Limb Ataxia ?> 0 = No ataxia
8: Sensation ?> 0 = Normal; no sensory loss
9: Language/aphasia ?> 0 = Normal; no aphasia
10: Dysarthria ?> 0 = Normal
11: Extinction/inattention ?> 0 = No abnormality


Course
Reevaluation(s)
Reevaluation #1: 
Pt reports all sx have resolved, including the N/T to his right hand.
Time: 21:03
Vital Signs
Vital signs: 

Vital Signs

Temperature  98 F   02/15/24 19:11
Pulse Rate  72   02/15/24 19:11
Respiratory Rate  18   02/15/24 19:11
Blood Pressure  139/89   02/15/24 19:11
Pulse Oximetry  99   02/15/24 19:11
Oxygen Delivery Method  Room Air  02/15/24 19:11



Temperature  98 F   02/15/24 19:11
Pulse Rate  63   24 00:14
Respiratory Rate  21   24 00:14
Blood Pressure  113/69   24 00:14
Pulse Oximetry  99   02/15/24 20:40
Oxygen Delivery Method  Room Air  02/15/24 19:11




MDM - Neuro Symptoms/Deficit
MDM Narrative
Medical decision making narrative: 
CBC, BMP were unremarkable. LFTs were mildly elevated. CT head without contrast was negative for acute findings. CTA head and neck were pending. Care was resumed to Dr. Tejada. See her dictation for further evaluation and treatment. 
Medical Records
Attestation: I reviewed the patient's medical records.
Lab Data
Attestation: I reviewed the patient's lab results.
Labs: 

Lab Results

  02/15/24 02/15/24 Range/Units
  19:21 19:45 
WBC  5.7   (4.0-11.0)  10^3/uL
RBC  5.17   (4.70-6.10)  10^6/uL
Hgb  15.6   (14.0-18.0)  g/dL
Hct  46.0   (42.0-54.0)  %
MCV  89.0   (80.0-94.0)  fL
MCH  30.2   (25.9-34.0)  pg
MCHC  33.9   (29.9-35.2)  g/dL
RDW  12.7   (11.0-15.0)  %
Plt Count  146 L   (150-450)  10^3/uL
MPV  9.4 L   (9.5-13.5)  fL
Neut % (Auto)  54.6   (43.0-75.0)  %
Lymph % (Auto)  34.6   (20.5-60.0)  %
Mono % (Auto)  7.4   (1.7-12.0)  %
Eos % (Auto)  2.3   (0.9-7.0)  %
Baso % (Auto)  0.7   (0.2-2.0)  %
Neut # (Auto)  3.1   (1.4-6.5)  10^3/uL
Lymph # (Auto)  2.0   (1.2-3.8)  10^3/uL
Mono # (Auto)  0.4   (0.3-0.8)  10^3/uL
Eos # (Auto)  0.1   (0.0-0.7)  10^3/uL
Baso # (Auto)  0.0   (0.0-0.1)  10^3/uL
Abs Immat Gran (auto)  0.02   (0.00-0.03)  10^3/uL
Imm/Tot Granulo (auto)  0.4   (0.0-0.5)  %
PT  10.7   (9.0-11.6)  sec
INR  1.01   
APTT  31.1   (22.3-36.2)  sec
Sodium  140   (136-145)  mmol/L
Potassium  4.0   (3.5-5.1)  mmol/L
Chloride  104   ()  mmol/L
Carbon Dioxide  28.5   (21.0-32.0)  mmol/L
Anion Gap  11.5   
BUN  12.0   (7.0-18.0)  mg/dL
Creatinine  0.99   (0.70-1.30)  mg/dL
Est GFR ( Amer)  >60   (>=60)  
Est GFR (Non-Af Amer)  >60   (>=60)  
BUN/Creatinine Ratio  12.1   
Glucose  116 H   ()  mg/dL
Calcium  9.3   (8.5-10.1)  mg/dL
Total Bilirubin  1.1 H   (0.2-1.0)  mg/dL
AST  50 H   (15-37)  U/L
ALT  135 H   (16-63)  U/L
Alkaline Phosphatase  65   ()  U/L
Troponin I High Sens  11.7   (4.0-76.1)  pg/mL
Total Protein  7.9   (6.4-8.2)  g/dL
Albumin  4.0   (3.4-5.0)  g/dL
Globulin  3.9   g/dL
Albumin/Globulin Ratio  1.0   
POC Glucose   98  ()  mg/dL



Imaging Data
CT scan - head: 
      Attestation: I have reviewed the pertinent imaging results.
      Radiologist's impression: 

ITS Impressions

Head CT  02/15/24 19:19
IMPRESSION:
 
No acute or significant intracranial pathology. 
 
 
Electronically authenticated by: TAMMI GARCIA   Date: 2/15/2024  19:55


Head CTA  02/15/24 19:19
IMPRESSION:
 
1. No significant stenoses or occlusions throughout the extracranial carotid 
and vertebral arteries.
2. No large vessel occlusions throughout the intracranial arteries
 
 
Electronically authenticated by: DEANNE WEEKS   Date: 2/15/2024  21:56


Neck CTA  02/15/24 19:19
IMPRESSION:
 
1. No significant stenoses or occlusions throughout the extracranial carotid 
and vertebral arteries.
2. No large vessel occlusions throughout the intracranial arteries
 
 
Electronically authenticated by: DEANNE WEEKS   Date: 2/15/2024  21:56



Procedure:  CT head/brain wo con
 
CT OF THE BRAIN WITHOUT CONTRAST: 2/15/2024 7:28 PM EST
 
HISTORY: Visual disturbance and slurred speech
 
TECHNIQUE: Contiguous axially collimated images were obtained through the 
intracranial compartment, from the vertex through the foramen magnum. Coronal 
and Sagittal reformatted images were prepared on a separate workstation and 
reviewed on the PACS for anatomic correlation. No contrast was administered. 
This CT exam was performed using one or more of the following dose reduction 
techniques: Automated exposure control, adjustment of the mA and/or kV 
according to patient size, or use of iterative reconstruction technique. Thin 
section coronal and sagittal images were reconstructed from the axial data 
set. 
All images were reviewed and interpreted.
 
COMPARISON: None.
 
FINDINGS: 
 
There is no intracranial hemorrhage or abnormal extra-axial fluid collection.
 
To the extent of evaluated with noncontrast technique, there is no mass lesion 
 
appreciated. There is no mass-effect or shift of midline structures. The 
ventricles and CSF spaces are age appropriate. There is no evidence of 
hydrocephalus. There is no effacement of the basal cisterns. 
 
Gray white matter differentiation is well preserved throughout, without 
evidence of acute ischemia. There is no significant leukomalacia. The basal 
ganglia and thalami are unremarkable.
 
The posterior fossa, brain stem, and fourth ventricle are normal. There is no 
tonsillar ectopy. 
 
The calvarium is intact, without destructive lesion or depressed fracture.
 
Limited assessment of orbits. No obvious abnormality. Correlate with 
ophthalmic 
exam.
 
The mastoid air cells are well-aerated. The paranasal sinuses are normally 
aerated.
 

ORDER #: [id number] CT/CT head/brain wo con
IMPRESSION:
 
No acute or significant intracranial pathology. 
 
 
Electronically authenticated by: TAMMI GARCIA   Date: 2/15/2024  19:55
ECG Data
Attestation: ?I have reviewed the pertinent ECG results. (EKG was reviewed by the attending physician. It showed sinus rhythm at a rate of 63. No acute ST segment changes. )
Interpretation: 
Measurements
Intervals                              Axis          
Rate:         63                       P:            43
CO:           198                      QRS:          -7
QRSD:         102                      T:            24
QT:           378                                    
QTc:          384                                    
                           Interpretive Statements
1100 Sinus rhythm
3114 Cannot rule out anterior myocardial infarction, age undetermined
9150 **  abnormal ECG  **
No previous ECG available for comparison

Discharge Plan
Discharge
Chief Complaint: Neuro Symptoms/Deficit

Clinical Impression:
 Transient cerebral ischemia


Patient Disposition: [facility]

Time of Disposition Decision: 23:36

Discharge Location[facility]

Condition: Good


___________________________________________________________________________

Documented by User:  Tamika Tejada MD  24 00:25

HPI - Neuro Symptoms/Deficit
General
Chief Complaint: Neuro Symptoms/Deficit
Stated Complaint: neuro symptoms
Time Seen by Provider: 02/15/24 19:18
Source: family
Related Data
Home Medications

 Medication  Instructions  Recorded  Confirmed
apixaban 5 mg tablet (Eliquis) 5 mg PO Q12H 02/15/24 02/15/24
aspirin 81 mg tablet,delayed 81 mg PO .q24 02/15/24 02/15/24
release   
empagliflozin 10 mg tablet 10 mg PO .q24 02/15/24 02/15/24
(Jardiance)   
furosemide 40 mg tablet 40 mg PO Q12H 02/15/24 02/15/24
metoprolol succinate 50 mg 50 mg PO .q24 02/15/24 02/15/24
tablet,extended release 24 hr   
sacubitril 49 mg-valsartan 51 mg 1 tab PO BID 02/15/24 02/15/24
tablet (Entresto)   
spironolactone 25 mg tablet 25 mg PO .q24 02/15/24 02/15/24


Allergies

Allergy/AdvReac Type Severity Reaction Status Date / Time
iodine Allergy Severe Anaphylaxis Verified 02/15/24 19:18



[facility]
Medical History (Updated 02/15/24 @ 23:37 by Tamika Tejada MD)

Heart attack
 ?I21.9 - Acute myocardial infarction, unspecified (ICD-10)


Social History
Smoking status:  Former smoker 



Exam
Constitutional
Vital Signs, click to edit/add: 

Last Vital Signs

Temp  98 F   02/15/24 19:11
Pulse  63   24 00:14
Resp  21   24 00:14
BP  113/69   24 00:14
Pulse Ox  99   02/15/24 20:40
O2 Del Method  Room Air  02/15/24 19:11




Course
Vital Signs
Vital signs: 

Vital Signs

Temperature  98 F   02/15/24 19:11
Pulse Rate  72   02/15/24 19:11
Respiratory Rate  18   02/15/24 19:11
Blood Pressure  139/89   02/15/24 19:11
Pulse Oximetry  99   02/15/24 19:11
Oxygen Delivery Method  Room Air  02/15/24 19:11



Temperature  98 F   02/15/24 19:11
Pulse Rate  63   24 00:14
Respiratory Rate  21   24 00:14
Blood Pressure  113/69   24 00:14
Pulse Oximetry  99   02/15/24 20:40
Oxygen Delivery Method  Room Air  02/15/24 19:11




MDM - Neuro Symptoms/Deficit
MDM Narrative
Medical decision making narrative: 
CBC, BMP were unremarkable. LFTs were mildly elevated. CT head without contrast was negative for acute findings. CTA head and neck were pending. Care was resumed to Dr. Tejada. See her dictation for further evaluation and treatment. 

33-year-old male was seen and evaluated in conjunction with the physician assistant. Please refer to his full H and P. In brief thhis patient had a cardiac event in January of this year in which she was told that he had an ejection fraction of 20 
percent with clean coronary arteries and had heart failure. He was told that he may have these symptoms due to a viral illness/myocarditis I assume. He is on Eliquis, aspirin and Entresto. Early this evening while at home the patient had an episode 
of dysarthria that lasted several minutes. He then had some change in his vision in his right eye, peripheral vision. Upon arrival his dysarthria and vision change had resolved but he was having some numbness and tingling in the right hand. 
Accu-Chek was 97. His NIH stroke scale was 0. His peripheral vision was intact, there is no facial droop, his cognition was intact, he still felt that he was having trouble finding some words. He was shaking his right hand stating that he was having 
some numbness and tingling in the right hand.He was able to approximate thumb and all fingers. Sensation was intact and was equal bilaterally. He was taken for a CT scan of the head which was read by radiology as normal. He has a contrast ALLERGY so 
he was premedicated with Solu-Medrol, Pepcid and Benadryl and CT angiogram of the head and neck was ordered. These are normal according to radiology. The patient was reevaluated several times while in the emergency department while waiting for his 
studies to results and his symptoms have completely resolved including the numbness and tingling in the right 5th finger.
Labs are reviewed and EKG is reviewed. EKG is essentially unchanged from prior EKGs and does not show any acute findings. 
Troponin is within normal limits.
I discussed my concerns about the patient having a transient ischemic attack which could be a prrecursor to a stroke considering his recent cardiac problems, low ejection fraction and suggested that he be transferred to a higher level of care where 
neurology is available. The patient spoke to his wife and father and ultimately agreed to transfer. The case was discussed with Dr. Kat at [facility] and he is accepted for transfer
The patient asked if he should take his nighttime medications and I suggested that he did and at that time, he realized that he may have missed a dose of his Eliquis..
Patient will be transferred to [facility] by private vehicle due to an extended wait time for EMS transfer
Medical Records
Medical records narrative: 





The [address] [address]

CT Scan Report 
Signed
Patient: BRITTANY CHAMORRO


MR#: [id number]
: 1990


Acct:[id number]
Age/Sex: 33 / M


ADM Date: 02/15/24
Loc: ER  


Attending Dr: 


Ordering Physician: Miriam Wilhelm
Date of Service: 02/15/24
Procedure(s): CT head/brain wo con
Accession Number(s): [id number]

cc: Physician,Non-Staff M.[email or web address]~


 
     The [address] [address] [phone]
 
 
Patient Name:
BRITTANY CHAMORRO
 
MRN: [id number]    YOB: 1990    Sex: M
Assigned Patient Location: ER
Current Patient Location: ER
Accession/Order Number: [id number]
Exam Date: 2/15/2024  19:28    Report Date: 2/15/2024  19:55
 
At the request of:
MIRIAM WILHELM  
 
Procedure:  CT head/brain wo con
 
CT OF THE BRAIN WITHOUT CONTRAST: 2/15/2024 7:28 PM EST
 
HISTORY: Visual disturbance and slurred speech
 
TECHNIQUE: Contiguous axially collimated images were obtained through the 
intracranial compartment, from the vertex through the foramen magnum. Coronal 
and Sagittal reformatted images were prepared on a separate workstation and 
reviewed on the PACS for anatomic correlation. No contrast was administered. 
This CT exam was performed using one or more of the following dose reduction 
techniques: Automated exposure control, adjustment of the mA and/or kV 
according to patient size, or use of iterative reconstruction technique. Thin 
section coronal and sagittal images were reconstructed from the axial data 
set. 
All images were reviewed and interpreted.
 
COMPARISON: None.
 
FINDINGS: 
 
There is no intracranial hemorrhage or abnormal extra-axial fluid collection.
 
To the extent of evaluated with noncontrast technique, there is no mass lesion 
 
appreciated. There is no mass-effect or shift of midline structures. The 
ventricles and CSF spaces are age appropriate. There is no evidence of 
hydrocephalus. There is no effacement of the basal cisterns. 
 
Gray white matter differentiation is well preserved throughout, without 
evidence of acute ischemia. There is no significant leukomalacia. The basal 
ganglia and thalami are unremarkable.
 
The posterior fossa, brain stem, and fourth ventricle are normal. There is no 
tonsillar ectopy. 
 
The calvarium is intact, without destructive lesion or depressed fracture.
 
Limited assessment of orbits. No obvious abnormality. Correlate with 
ophthalmic 
exam.
 
The mastoid air cells are well-aerated. The paranasal sinuses are normally 
aerated.
 

ORDER #: [id number] CT/CT head/brain wo con
IMPRESSION:
 
No acute or significant intracranial pathology. 
 
 
Electronically authenticated by: TAMMI GARCIA   Date: 2/15/2024  19:55
Lab Data
Labs: 

Lab Results

  02/15/24 02/15/24 Range/Units
  19:21 19:45 
WBC  5.7   (4.0-11.0)  10^3/uL
RBC  5.17   (4.70-6.10)  10^6/uL
Hgb  15.6   (14.0-18.0)  g/dL
Hct  46.0   (42.0-54.0)  %
MCV  89.0   (80.0-94.0)  fL
MCH  30.2   (25.9-34.0)  pg
MCHC  33.9   (29.9-35.2)  g/dL
RDW  12.7   (11.0-15.0)  %
Plt Count  146 L   (150-450)  10^3/uL
MPV  9.4 L   (9.5-13.5)  fL
Neut % (Auto)  54.6   (43.0-75.0)  %
Lymph % (Auto)  34.6   (20.5-60.0)  %
Mono % (Auto)  7.4   (1.7-12.0)  %
Eos % (Auto)  2.3   (0.9-7.0)  %
Baso % (Auto)  0.7   (0.2-2.0)  %
Neut # (Auto)  3.1   (1.4-6.5)  10^3/uL
Lymph # (Auto)  2.0   (1.2-3.8)  10^3/uL
Mono # (Auto)  0.4   (0.3-0.8)  10^3/uL
Eos # (Auto)  0.1   (0.0-0.7)  10^3/uL
Baso # (Auto)  0.0   (0.0-0.1)  10^3/uL
Abs Immat Gran (auto)  0.02   (0.00-0.03)  10^3/uL
Imm/Tot Granulo (auto)  0.4   (0.0-0.5)  %
PT  10.7   (9.0-11.6)  sec
INR  1.01   
APTT  31.1   (22.3-36.2)  sec
Sodium  140   (136-145)  mmol/L
Potassium  4.0   (3.5-5.1)  mmol/L
Chloride  104   ()  mmol/L
Carbon Dioxide  28.5   (21.0-32.0)  mmol/L
Anion Gap  11.5   
BUN  12.0   (7.0-18.0)  mg/dL
Creatinine  0.99   (0.70-1.30)  mg/dL
Est GFR ( Amer)  >60   (>=60)  
Est GFR (Non-Af Amer)  >60   (>=60)  
BUN/Creatinine Ratio  12.1   
Glucose  116 H   ()  mg/dL
Calcium  9.3   (8.5-10.1)  mg/dL
Total Bilirubin  1.1 H   (0.2-1.0)  mg/dL
AST  50 H   (15-37)  U/L
ALT  135 H   (16-63)  U/L
Alkaline Phosphatase  65   ()  U/L
Troponin I High Sens  11.7   (4.0-76.1)  pg/mL
Total Protein  7.9   (6.4-8.2)  g/dL
Albumin  4.0   (3.4-5.0)  g/dL
Globulin  3.9   g/dL
Albumin/Globulin Ratio  1.0   
POC Glucose   98  ()  mg/dL



Imaging Data
CT scan - head: 
      Radiologist's impression: 

ITS Impressions

Head CT  02/15/24 19:19
IMPRESSION:
 
No acute or significant intracranial pathology. 
 
 
Electronically authenticated by: TAMMI GARCIA   Date: 2/15/2024  19:55


Head CTA  02/15/24 19:19
IMPRESSION:
 
1. No significant stenoses or occlusions throughout the extracranial carotid 
and vertebral arteries.
2. No large vessel occlusions throughout the intracranial arteries
 
 
Electronically authenticated by: DEANNE WEEKS   Date: 2/15/2024  21:56


Neck CTA  02/15/24 19:19
IMPRESSION:
 
1. No significant stenoses or occlusions throughout the extracranial carotid 
and vertebral arteries.
2. No large vessel occlusions throughout the intracranial arteries
 
 
Electronically authenticated by: DEANNE WEEKS   Date: 2/15/2024  21:56




ECG Data
Attestation: I personally reviewed and interpreted this ECG as follows: (EKG was reviewed by the attending physician. It showed sinus rhythm at a rate of 63. No acute ST segment changes. )

Critical Care Time
Critical Care Time
Critical Care Time: Yes
Total Critical Care Time: 40
Attestation: 
Patient was seen by myself and Feli Caputo, I was unable to close the chart with her documentation in place and some of her documentation may be deleted

Discharge Plan
Discharge
Chief Complaint: Neuro Symptoms/Deficit

Clinical Impression:
 Transient cerebral ischemia


Patient Disposition: [facility]

Time of Disposition Decision: 23:36

Discharge Location: [facility]

Condition: Good

## 2024-02-16 VITALS
RESPIRATION RATE: 16 BRPM | HEART RATE: 63 BPM | SYSTOLIC BLOOD PRESSURE: 116 MMHG | OXYGEN SATURATION: 99 % | TEMPERATURE: 97.88 F | DIASTOLIC BLOOD PRESSURE: 63 MMHG

## 2024-02-16 VITALS — HEART RATE: 61 BPM | RESPIRATION RATE: 17 BRPM

## 2024-02-16 VITALS — DIASTOLIC BLOOD PRESSURE: 69 MMHG | SYSTOLIC BLOOD PRESSURE: 113 MMHG | RESPIRATION RATE: 21 BRPM | HEART RATE: 63 BPM

## 2024-02-16 VITALS — RESPIRATION RATE: 15 BRPM | HEART RATE: 68 BPM

## 2024-03-06 NOTE — CR1_ITS
[facility] 
                                        
                                       Test Date:    2024 
Pat Name:     BRITTANY CHAMORRO       Department:    
Patient ID:   [id number]               Room:         - 
Gender:       Male                     Technician:    
:          1990               Requested By: BOOGIE PARMAR 
Order Number: [id number]              Alexis MD:   BOOGIE PARMAR 
                           Interpretive Statements 
Session Date: 
 
Electronically Signed On 3-6-2024 23:04:08 EST by BOOGIE PARMAR

## 2024-03-22 ENCOUNTER — HOSPITAL ENCOUNTER (OUTPATIENT)
Dept: RADIOLOGY | Facility: CLINIC | Age: 34
Discharge: HOME | End: 2024-03-22
Payer: COMMERCIAL

## 2024-03-22 DIAGNOSIS — I21.4 NSTEMI (NON-ST ELEVATED MYOCARDIAL INFARCTION) (MULTI): ICD-10-CM

## 2024-03-22 DIAGNOSIS — I42.8 NICM (NONISCHEMIC CARDIOMYOPATHY) (MULTI): ICD-10-CM

## 2024-03-22 DIAGNOSIS — I51.3 APICAL MURAL THROMBUS: ICD-10-CM

## 2024-03-22 PROCEDURE — 75565 CARD MRI VELOC FLOW MAPPING: CPT

## 2024-03-22 PROCEDURE — A9575 INJ GADOTERATE MEGLUMI 0.1ML: HCPCS

## 2024-03-22 PROCEDURE — 75565 CARD MRI VELOC FLOW MAPPING: CPT | Performed by: INTERNAL MEDICINE

## 2024-03-22 PROCEDURE — 75561 CARDIAC MRI FOR MORPH W/DYE: CPT | Performed by: INTERNAL MEDICINE

## 2024-03-22 PROCEDURE — 2550000001 HC RX 255 CONTRASTS

## 2024-03-22 PROCEDURE — 75561 CARDIAC MRI FOR MORPH W/DYE: CPT

## 2024-03-22 RX ORDER — GADOTERATE MEGLUMINE 376.9 MG/ML
39 INJECTION INTRAVENOUS
Status: COMPLETED | OUTPATIENT
Start: 2024-03-22 | End: 2024-03-22

## 2024-03-22 RX ADMIN — GADOTERATE MEGLUMINE 39 ML: 376.9 INJECTION INTRAVENOUS at 16:21

## 2024-07-24 ENCOUNTER — HOSPITAL ENCOUNTER (OUTPATIENT)
Dept: RADIOLOGY | Facility: CLINIC | Age: 34
End: 2024-07-24
Payer: COMMERCIAL

## 2024-07-24 ENCOUNTER — APPOINTMENT (OUTPATIENT)
Dept: RADIOLOGY | Facility: CLINIC | Age: 34
End: 2024-07-24
Payer: COMMERCIAL

## 2024-09-06 ENCOUNTER — APPOINTMENT (OUTPATIENT)
Dept: RADIOLOGY | Facility: CLINIC | Age: 34
End: 2024-09-06
Payer: COMMERCIAL

## 2024-09-06 ENCOUNTER — HOSPITAL ENCOUNTER (OUTPATIENT)
Dept: RADIOLOGY | Facility: CLINIC | Age: 34
Discharge: HOME | End: 2024-09-06
Payer: COMMERCIAL

## 2024-09-06 DIAGNOSIS — I51.4 MYOCARDITIS, UNSPECIFIED (MULTI): ICD-10-CM

## 2024-09-06 PROCEDURE — 75565 CARD MRI VELOC FLOW MAPPING: CPT

## 2024-09-06 PROCEDURE — 2550000001 HC RX 255 CONTRASTS

## 2024-09-06 PROCEDURE — 75561 CARDIAC MRI FOR MORPH W/DYE: CPT

## 2024-09-06 PROCEDURE — A9575 INJ GADOTERATE MEGLUMI 0.1ML: HCPCS

## 2024-09-06 RX ORDER — GADOTERATE MEGLUMINE 376.9 MG/ML
40 INJECTION INTRAVENOUS
Status: COMPLETED | OUTPATIENT
Start: 2024-09-06 | End: 2024-09-06

## 2025-01-10 PROCEDURE — 93308 TTE F-UP OR LMTD: CPT | Performed by: INTERNAL MEDICINE
